# Patient Record
Sex: FEMALE | Race: WHITE | NOT HISPANIC OR LATINO | URBAN - NONMETROPOLITAN AREA
[De-identification: names, ages, dates, MRNs, and addresses within clinical notes are randomized per-mention and may not be internally consistent; named-entity substitution may affect disease eponyms.]

---

## 2019-11-01 ENCOUNTER — IMPORTED ENCOUNTER (OUTPATIENT)
Dept: URBAN - NONMETROPOLITAN AREA CLINIC 1 | Facility: CLINIC | Age: 39
End: 2019-11-01

## 2019-11-01 PROBLEM — H52.223: Noted: 2019-11-01

## 2019-11-01 PROBLEM — H52.13: Noted: 2019-11-01

## 2019-11-01 PROCEDURE — 92310 CONTACT LENS FITTING OU: CPT

## 2019-11-01 PROCEDURE — 92004 COMPRE OPH EXAM NEW PT 1/>: CPT

## 2019-11-01 PROCEDURE — 92015 DETERMINE REFRACTIVE STATE: CPT

## 2019-11-01 NOTE — PATIENT DISCUSSION
Compound Myopic Astigmatism OU -  discussed findings w/patient-  new spectacle/CL Rx issued-  continue to monitor yearly or prn; 's Notes: MR 11/1/2019DFE defer

## 2021-03-29 ENCOUNTER — IMPORTED ENCOUNTER (OUTPATIENT)
Dept: URBAN - NONMETROPOLITAN AREA CLINIC 1 | Facility: CLINIC | Age: 41
End: 2021-03-29

## 2021-03-29 PROBLEM — H52.223: Noted: 2019-11-01

## 2021-03-29 PROBLEM — H52.13: Noted: 2019-11-01

## 2021-03-29 PROBLEM — H52.4: Noted: 2021-03-29

## 2021-03-29 PROCEDURE — 92310 CONTACT LENS FITTING OU: CPT

## 2021-03-29 PROCEDURE — S0621 ROUTINE OPHTHALMOLOGICAL EXA: HCPCS

## 2021-03-29 NOTE — PATIENT DISCUSSION
Compound Myopic Astigmatism OU -  discussed findings w/patient-  no changes noted at this time-  new spectacle/CL Rx issued-  continue to monitor yearly or prn; 's Notes: MR 3/29/2021DFE 3/29/2021

## 2022-04-10 ASSESSMENT — VISUAL ACUITY
OU_CC: J1+
OD_SC: 20/20
OS_SC: 20/20
OU_SC: 20/20
OU_SC: 20/20
OS_SC: 20/25 -1
OD_SC: 20/20
OU_CC: 20/20

## 2022-04-10 ASSESSMENT — TONOMETRY
OD_IOP_MMHG: 14
OS_IOP_MMHG: 14
OD_IOP_MMHG: 12
OS_IOP_MMHG: 13

## 2022-12-08 ENCOUNTER — COMPREHENSIVE EXAM (OUTPATIENT)
Dept: URBAN - NONMETROPOLITAN AREA CLINIC 4 | Facility: CLINIC | Age: 42
End: 2022-12-08

## 2022-12-08 PROCEDURE — S0621 ROUTINE OPHTHALMOLOGICAL EXA: HCPCS

## 2022-12-08 PROCEDURE — 92310-E CONTACT LENS FITTING ESTABLISH PATIENT

## 2022-12-08 ASSESSMENT — TONOMETRY
OD_IOP_MMHG: 14
OS_IOP_MMHG: 15

## 2022-12-08 ASSESSMENT — VISUAL ACUITY
OU_CC: 20/20
OD_CC: 20/20
OU_CC: J1+
OS_CC: 20/25